# Patient Record
Sex: FEMALE | Race: WHITE | NOT HISPANIC OR LATINO | Employment: OTHER | URBAN - METROPOLITAN AREA
[De-identification: names, ages, dates, MRNs, and addresses within clinical notes are randomized per-mention and may not be internally consistent; named-entity substitution may affect disease eponyms.]

---

## 2024-09-11 ENCOUNTER — OFFICE VISIT (OUTPATIENT)
Dept: FAMILY MEDICINE | Facility: CLINIC | Age: 74
End: 2024-09-11
Payer: MEDICARE

## 2024-09-11 VITALS
OXYGEN SATURATION: 98 % | SYSTOLIC BLOOD PRESSURE: 133 MMHG | DIASTOLIC BLOOD PRESSURE: 81 MMHG | TEMPERATURE: 97.7 F | HEART RATE: 74 BPM | WEIGHT: 143 LBS | RESPIRATION RATE: 16 BRPM

## 2024-09-11 DIAGNOSIS — N30.00 ACUTE CYSTITIS WITHOUT HEMATURIA: Primary | ICD-10-CM

## 2024-09-11 DIAGNOSIS — R39.89 URINARY PROBLEM: ICD-10-CM

## 2024-09-11 LAB
ALBUMIN UR-MCNC: NEGATIVE MG/DL
APPEARANCE UR: CLEAR
BACTERIA #/AREA URNS HPF: ABNORMAL /HPF
BILIRUB UR QL STRIP: NEGATIVE
COLOR UR AUTO: YELLOW
GLUCOSE UR STRIP-MCNC: NEGATIVE MG/DL
HGB UR QL STRIP: NEGATIVE
KETONES UR STRIP-MCNC: NEGATIVE MG/DL
LEUKOCYTE ESTERASE UR QL STRIP: ABNORMAL
NITRATE UR QL: NEGATIVE
PH UR STRIP: 7 [PH] (ref 5–8)
RBC #/AREA URNS AUTO: ABNORMAL /HPF
SP GR UR STRIP: 1.01 (ref 1–1.03)
SQUAMOUS #/AREA URNS AUTO: ABNORMAL /LPF
UROBILINOGEN UR STRIP-ACNC: 0.2 E.U./DL
WBC #/AREA URNS AUTO: ABNORMAL /HPF

## 2024-09-11 PROCEDURE — 87086 URINE CULTURE/COLONY COUNT: CPT | Performed by: FAMILY MEDICINE

## 2024-09-11 PROCEDURE — 81001 URINALYSIS AUTO W/SCOPE: CPT | Performed by: FAMILY MEDICINE

## 2024-09-11 PROCEDURE — 99203 OFFICE O/P NEW LOW 30 MIN: CPT | Performed by: FAMILY MEDICINE

## 2024-09-11 RX ORDER — BACLOFEN 10 MG/1
1 TABLET ORAL
COMMUNITY
Start: 2024-08-28

## 2024-09-11 RX ORDER — ESTRADIOL 10 UG/1
TABLET VAGINAL
COMMUNITY

## 2024-09-11 RX ORDER — GABAPENTIN 300 MG/1
CAPSULE ORAL
COMMUNITY
Start: 2024-07-26

## 2024-09-11 RX ORDER — FAMOTIDINE 10 MG
10 TABLET ORAL DAILY
COMMUNITY

## 2024-09-11 RX ORDER — NITROFURANTOIN 25; 75 MG/1; MG/1
100 CAPSULE ORAL 2 TIMES DAILY
Qty: 14 CAPSULE | Refills: 0 | Status: SHIPPED | OUTPATIENT
Start: 2024-09-11 | End: 2024-09-18

## 2024-09-11 RX ORDER — LEVOTHYROXINE SODIUM 100 UG/1
100 TABLET ORAL DAILY
COMMUNITY

## 2024-09-11 RX ORDER — FLUTICASONE PROPIONATE 50 MCG
SPRAY, SUSPENSION (ML) NASAL
COMMUNITY

## 2024-09-11 RX ORDER — MONTELUKAST SODIUM 10 MG/1
1 TABLET ORAL DAILY
COMMUNITY

## 2024-09-11 RX ORDER — ACYCLOVIR 400 MG/1
400 TABLET ORAL 3 TIMES DAILY PRN
COMMUNITY

## 2024-09-11 NOTE — PATIENT INSTRUCTIONS
Continue drinking water     Macrobid twice daily for 7 days    Urine culture is pending, we will call if a change in treatment is needed

## 2024-09-12 ENCOUNTER — TELEPHONE (OUTPATIENT)
Dept: FAMILY MEDICINE | Facility: CLINIC | Age: 74
End: 2024-09-12
Payer: MEDICARE

## 2024-09-12 LAB — BACTERIA UR CULT: NORMAL

## 2024-09-12 NOTE — PROGRESS NOTES
Assessment/Plan:   1. Urinary problem  - UA Macroscopic with reflex to Microscopic and Culture - Clinic Collect  - UA Microscopic with Reflex to Culture  - Urine Culture Aerobic Bacterial  2. Acute cystitis without hematuria  - nitroFURantoin macrocrystal-monohydrate (MACROBID) 100 MG capsule; Take 1 capsule (100 mg) by mouth 2 times daily for 7 days.  Dispense: 14 capsule; Refill: 0    Acute onset urinary frequency and dysuria since yesterday.  Chills with voiding and vague feverish feeling.  Symptoms typical of her past UTI.  No other respiratory symptoms to suggest other cause of fever.  Exam is benign.  No CVA tenderness with percussion percussion to suggest pyelonephritis.  UA is fairly benign.  Urine culture is pending.  Given her past history, we will treat with Macrobid twice daily for 7 days pending urine culture.  We can make an adjustment as needed.  She will return if worse or not improving.    Plan:  Continue drinking water   Macrobid twice daily for 7 days  Urine culture is pending, we will call if a change in treatment is needed     I discussed red flag symptoms, return precautions to clinic/ER and follow up care with patient/parent.  Expected clinical course, symptomatic cares advised. Questions answered. Patient/parent amenable with plan.        Subjective:     Tierra Fitzgerald is a 74 year old female who presents for evaluation of urinary symptoms.  Since yesterday she has felt like she has a urinary tract infection.  She feels chills when ever she voids.  She is felt a little bit feverish but has not had an elevated temperature when she is checked.  She is having increased urinary frequency which is much more than usual for her.  She has mild burning with urination.  No blood in her urine, no vaginal symptoms.  She denies nausea vomiting diarrhea constipation sore throat nasal congestion cough or other symptoms of respiratory infection.  Symptoms are typical of her prior urinary tract  infections.  She just moved to this area 3 weeks ago from [place].    Allergies   Allergen Reactions    Pollen Extract Other (See Comments)     itchy eyes and sneezing -     Current Outpatient Medications   Medication Sig Dispense Refill    baclofen (LIORESAL) 10 MG tablet Take 1 tablet by mouth 3 times daily.      gabapentin (NEURONTIN) 300 MG capsule take 1 capsule by mouth three times daily as needed      nitroFURantoin macrocrystal-monohydrate (MACROBID) 100 MG capsule Take 1 capsule (100 mg) by mouth 2 times daily for 7 days. 14 capsule 0    acyclovir (ZOVIRAX) 400 MG tablet Take 400 mg by mouth 3 times daily as needed.      famotidine (PEPCID) 10 MG tablet Take 10 mg by mouth daily.      fluticasone (FLONASE) 50 MCG/ACT nasal spray USE 2 SPRAY(S) IN EACH NOSTRIL IN THE MORNING      levothyroxine (SYNTHROID/LEVOTHROID) 100 MCG tablet Take 100 mcg by mouth daily.      magnesium sulfate 500 mg/mL SOLN Take by mouth.      montelukast (SINGULAIR) 10 MG tablet Take 1 tablet by mouth daily.      YUVAFEM 10 MCG TABS vaginal tablet INSERT 1 TABLET VAGINALLY ONCE OR TWICE A WEEK       No current facility-administered medications for this visit.     There is no problem list on file for this patient.      Objective:     /81   Pulse 74   Temp 97.7  F (36.5  C)   Resp 16   Wt 64.9 kg (143 lb)   SpO2 98%     Physical    General Appearance: Alert, pleasant, no distress, afebrile vital signs stable  Head: Normocephalic, without obvious abnormality, atraumatic  Eyes: Conjunctivae are normal.   Ears: Normal TMs and external ear canals, both ears  Nose: No significant congestion.  Throat: Throat is normal.  No exudate.  No vesicular lesions  Neck: No adenopathy  Lungs: Clear to auscultation bilaterally, respirations unlabored  Heart: Regular rate and rhythm  Abdomen: Soft, non-tender  Back: No CVA tenderness with percussion  Psychiatric: Patient has a normal mood and affect.         Results for orders placed or  performed in visit on 09/11/24   UA Macroscopic with reflex to Microscopic and Culture - Clinic Collect     Status: Abnormal    Specimen: Urine, Clean Catch   Result Value Ref Range    Color Urine Yellow Colorless, Straw, Light Yellow, Yellow    Appearance Urine Clear Clear    Glucose Urine Negative Negative mg/dL    Bilirubin Urine Negative Negative    Ketones Urine Negative Negative mg/dL    Specific Gravity Urine 1.010 1.005 - 1.030    Blood Urine Negative Negative    pH Urine 7.0 5.0 - 8.0    Protein Albumin Urine Negative Negative mg/dL    Urobilinogen Urine 0.2 0.2, 1.0 E.U./dL    Nitrite Urine Negative Negative    Leukocyte Esterase Urine Trace (A) Negative   UA Microscopic with Reflex to Culture     Status: Abnormal   Result Value Ref Range    Bacteria Urine Few (A) None Seen /HPF    RBC Urine 0-2 0-2 /HPF /HPF    WBC Urine 0-5 0-5 /HPF /HPF    Squamous Epithelials Urine Few (A) None Seen /LPF    Narrative    Urine Culture not indicated       This note has been dictated in part using voice recognition software.  Any grammatical or context distortions are unintentional and inherent to the software.  Please feel free to contact me directly for clarification if needed.

## 2024-11-15 ENCOUNTER — OFFICE VISIT (OUTPATIENT)
Dept: URGENT CARE | Facility: URGENT CARE | Age: 74
End: 2024-11-15
Payer: MEDICARE

## 2024-11-15 VITALS
SYSTOLIC BLOOD PRESSURE: 104 MMHG | TEMPERATURE: 97.9 F | RESPIRATION RATE: 16 BRPM | DIASTOLIC BLOOD PRESSURE: 68 MMHG | HEART RATE: 73 BPM | WEIGHT: 140 LBS | OXYGEN SATURATION: 96 %

## 2024-11-15 DIAGNOSIS — J01.10 ACUTE NON-RECURRENT FRONTAL SINUSITIS: Primary | ICD-10-CM

## 2024-11-15 PROBLEM — M79.604 LUMBAR PAIN WITH RADIATION DOWN BOTH LEGS: Status: ACTIVE | Noted: 2023-01-27

## 2024-11-15 PROBLEM — M54.50 LUMBAR PAIN WITH RADIATION DOWN BOTH LEGS: Status: ACTIVE | Noted: 2023-01-27

## 2024-11-15 PROBLEM — J30.1 ALLERGIC RHINITIS DUE TO POLLEN: Status: ACTIVE | Noted: 2020-07-21

## 2024-11-15 PROBLEM — M99.23: Status: ACTIVE | Noted: 2023-12-12

## 2024-11-15 PROBLEM — M71.30 SYNOVIAL CYST: Status: ACTIVE | Noted: 2023-12-12

## 2024-11-15 PROBLEM — M79.605 LUMBAR PAIN WITH RADIATION DOWN BOTH LEGS: Status: ACTIVE | Noted: 2023-01-27

## 2024-11-15 PROBLEM — D72.819 LEUKOPENIA: Status: ACTIVE | Noted: 2024-11-15

## 2024-11-15 PROBLEM — E03.9 HYPOTHYROIDISM: Status: ACTIVE | Noted: 2020-07-21

## 2024-11-15 PROBLEM — R16.1 SPLENOMEGALY: Status: ACTIVE | Noted: 2024-11-15

## 2024-11-15 PROBLEM — M81.0 OSTEOPOROSIS: Status: ACTIVE | Noted: 2024-04-26

## 2024-11-15 PROCEDURE — 99203 OFFICE O/P NEW LOW 30 MIN: CPT | Performed by: PHYSICIAN ASSISTANT

## 2024-11-15 NOTE — PROGRESS NOTES
Assessment & Plan     Acute non-recurrent frontal sinusitis  Patient was given Augmentin as ordered. Push fluids, rest and ibuprofen or tylenol for comfort.    RTC for persistent or worsening sx. continue Flonase and Singulair.  At the end of the encounter, I discussed results, diagnosis, medications. Discussed red flags for immediate return to clinic/ER, as well as indications for follow up if no improvement. Patient understood and agreed to plan. Patient was stable for discharge      - amoxicillin-clavulanate (AUGMENTIN) 875-125 MG tablet  Dispense: 20 tablet; Refill: 0             Angeles Blackwell PA-C  [facility] [place] is [place] 74 year old female who presents to clinic today for the following health issues:  Chief Complaint   Patient presents with    Sinus Problem     Feels like low grade fever sinus drainage and hoarse voice x 6 days. Tested negative for flu and covid about  1  1/2 weeks ago. Just got back from [place] last Friday.       HPI patient is a 74-year-old female with history of allergic rhinitis, hypothyroidism, chronic leukopenia, frequent sinus infections, history of process, who presents to urgent care with concerns regarding sinusitis.  Was ill with virus about 1.5 weeks.    Hx of sinusitis, followed by ENT in the past in [place].    Low fever, sinus drainage, cough, and facial pressure, discomfort.  Change of nasal discharge.  Flonase.  Does rancho pot daily.    No sinus surgery.    No sob, difficulty breathing.     No nausea, vomiting.    Symptoms are worsening rather than improving at this point.  She has establish care with a new allergist and has a pending appointment with ENT at [facility].    Review of Systems  Constitutional, HEENT, cardiovascular, pulmonary, gi and gu systems are negative, except as otherwise noted.      Objective    /68   Pulse 73   Temp 97.9  F (36.6  C)   Resp 16   Wt 63.5 kg (140 lb)   SpO2 96%   Physical  Exam   Pt is in no acute distress and appears well  Ears patent B:  TM s intact, non-injected. All land marks easily visibile    Nasal mucosa is edematous with mucoid discharge.  She has a septal nasal deviation to the right.  Tenderness palpation of the maxillary sinuses bilaterally.  Pharynx: non erythematous, tonsils non hypertrophied, No exudate   Neck supple: no adenopathy  Lungs: CTA  Heart: RRR, no murmur, no thrills or heaves   Ext: no edema  Skin: no rashes

## 2025-01-10 ENCOUNTER — LAB REQUISITION (OUTPATIENT)
Dept: LAB | Facility: CLINIC | Age: 75
End: 2025-01-10
Payer: MEDICARE

## 2025-01-10 DIAGNOSIS — Z79.1 LONG TERM (CURRENT) USE OF NON-STEROIDAL ANTI-INFLAMMATORIES (NSAID): ICD-10-CM

## 2025-01-10 DIAGNOSIS — Z13.6 ENCOUNTER FOR SCREENING FOR CARDIOVASCULAR DISORDERS: ICD-10-CM

## 2025-01-10 DIAGNOSIS — E03.9 HYPOTHYROIDISM, UNSPECIFIED: ICD-10-CM

## 2025-01-10 PROCEDURE — 80061 LIPID PANEL: CPT | Mod: ORL | Performed by: STUDENT IN AN ORGANIZED HEALTH CARE EDUCATION/TRAINING PROGRAM

## 2025-01-10 PROCEDURE — 80053 COMPREHEN METABOLIC PANEL: CPT | Mod: ORL | Performed by: STUDENT IN AN ORGANIZED HEALTH CARE EDUCATION/TRAINING PROGRAM

## 2025-01-10 PROCEDURE — 84443 ASSAY THYROID STIM HORMONE: CPT | Mod: ORL | Performed by: STUDENT IN AN ORGANIZED HEALTH CARE EDUCATION/TRAINING PROGRAM

## 2025-01-11 LAB
ALBUMIN SERPL BCG-MCNC: 4.8 G/DL (ref 3.5–5.2)
ALP SERPL-CCNC: 67 U/L (ref 40–150)
ALT SERPL W P-5'-P-CCNC: 22 U/L (ref 0–50)
ANION GAP SERPL CALCULATED.3IONS-SCNC: 14 MMOL/L (ref 7–15)
AST SERPL W P-5'-P-CCNC: 23 U/L (ref 0–45)
BILIRUB SERPL-MCNC: 0.5 MG/DL
BUN SERPL-MCNC: 18.3 MG/DL (ref 8–23)
CALCIUM SERPL-MCNC: 10.1 MG/DL (ref 8.8–10.4)
CHLORIDE SERPL-SCNC: 101 MMOL/L (ref 98–107)
CHOLEST SERPL-MCNC: 192 MG/DL
CREAT SERPL-MCNC: 0.79 MG/DL (ref 0.51–0.95)
EGFRCR SERPLBLD CKD-EPI 2021: 78 ML/MIN/1.73M2
FASTING STATUS PATIENT QL REPORTED: ABNORMAL
FASTING STATUS PATIENT QL REPORTED: NORMAL
GLUCOSE SERPL-MCNC: 100 MG/DL (ref 70–99)
HCO3 SERPL-SCNC: 25 MMOL/L (ref 22–29)
HDLC SERPL-MCNC: 105 MG/DL
LDLC SERPL CALC-MCNC: 67 MG/DL
NONHDLC SERPL-MCNC: 87 MG/DL
POTASSIUM SERPL-SCNC: 4.4 MMOL/L (ref 3.4–5.3)
PROT SERPL-MCNC: 7.8 G/DL (ref 6.4–8.3)
SODIUM SERPL-SCNC: 140 MMOL/L (ref 135–145)
TRIGL SERPL-MCNC: 102 MG/DL
TSH SERPL DL<=0.005 MIU/L-ACNC: 2.21 UIU/ML (ref 0.3–4.2)

## 2025-06-07 ENCOUNTER — ANCILLARY PROCEDURE (OUTPATIENT)
Dept: GENERAL RADIOLOGY | Facility: CLINIC | Age: 75
End: 2025-06-07
Attending: PHYSICIAN ASSISTANT
Payer: MEDICARE

## 2025-06-07 ENCOUNTER — OFFICE VISIT (OUTPATIENT)
Dept: URGENT CARE | Facility: URGENT CARE | Age: 75
End: 2025-06-07
Payer: MEDICARE

## 2025-06-07 VITALS
RESPIRATION RATE: 18 BRPM | HEART RATE: 71 BPM | BODY MASS INDEX: 22.5 KG/M2 | WEIGHT: 140 LBS | TEMPERATURE: 98.6 F | DIASTOLIC BLOOD PRESSURE: 82 MMHG | OXYGEN SATURATION: 96 % | SYSTOLIC BLOOD PRESSURE: 125 MMHG | HEIGHT: 66 IN

## 2025-06-07 DIAGNOSIS — M25.572 ACUTE LEFT ANKLE PAIN: ICD-10-CM

## 2025-06-07 DIAGNOSIS — S82.892A ANKLE FRACTURE, LEFT, CLOSED, INITIAL ENCOUNTER: Primary | ICD-10-CM

## 2025-06-07 PROCEDURE — 3074F SYST BP LT 130 MM HG: CPT | Performed by: PHYSICIAN ASSISTANT

## 2025-06-07 PROCEDURE — 73610 X-RAY EXAM OF ANKLE: CPT | Mod: TC | Performed by: RADIOLOGY

## 2025-06-07 PROCEDURE — 99214 OFFICE O/P EST MOD 30 MIN: CPT | Performed by: PHYSICIAN ASSISTANT

## 2025-06-07 PROCEDURE — 3079F DIAST BP 80-89 MM HG: CPT | Performed by: PHYSICIAN ASSISTANT

## 2025-06-07 NOTE — PROGRESS NOTES
"Assessment & Plan     1. Ankle fracture, left, closed, initial encounter (Primary)      2. Acute left ankle pain    - XR Ankle Left G/E 3 Views; Future     They will go to [facility] locally now for evaluation.                 DELMY [facility]    Jose L Mayorga is a 74 year old female who presents to clinic today for the following health issues:  Chief Complaint   Patient presents with    Left ankle injury - 2 days ago       HPI    Here for left ankle problem. Walking down driveway looking at a magazine. Veered off driveway fell landing on right shoulder. Did not hit head. No hip impact. Worst pain is the left ankle. Able to put weight on it. Swelling persists. Iced, elevated and ace wrap. Crutches the first day. Flexion is a \"bridge too far\".           Review of Systems        Objective    /82   Pulse 71   Temp 98.6  F (37  C)   Resp 18   Ht 1.676 m (5' 6\")   Wt 63.5 kg (140 lb)   SpO2 96%   BMI 22.60 kg/m    Physical Exam  Musculoskeletal:        Feet:    Feet:      Comments: Moderate tenderness                 "

## 2025-06-07 NOTE — PROGRESS NOTES
Urgent Care Clinic Visit    Chief Complaint   Patient presents with    Left ankle injury - 2 days ago